# Patient Record
Sex: FEMALE | Race: WHITE | NOT HISPANIC OR LATINO | URBAN - METROPOLITAN AREA
[De-identification: names, ages, dates, MRNs, and addresses within clinical notes are randomized per-mention and may not be internally consistent; named-entity substitution may affect disease eponyms.]

---

## 2022-01-21 ENCOUNTER — OFFICE (OUTPATIENT)
Dept: URBAN - METROPOLITAN AREA CLINIC 102 | Facility: CLINIC | Age: 42
End: 2022-01-21

## 2022-01-21 VITALS
WEIGHT: 221 LBS | HEART RATE: 83 BPM | TEMPERATURE: 98.2 F | DIASTOLIC BLOOD PRESSURE: 70 MMHG | SYSTOLIC BLOOD PRESSURE: 123 MMHG | HEIGHT: 61 IN

## 2022-01-21 DIAGNOSIS — D50.9 IRON DEFICIENCY ANEMIA, UNSPECIFIED: ICD-10-CM

## 2022-01-21 DIAGNOSIS — R12 HEARTBURN: ICD-10-CM

## 2022-01-21 DIAGNOSIS — Z98.84 BARIATRIC SURGERY STATUS: ICD-10-CM

## 2022-01-21 PROCEDURE — 00031: CPT | Performed by: INTERNAL MEDICINE

## 2022-01-21 PROCEDURE — 99204 OFFICE O/P NEW MOD 45 MIN: CPT

## 2022-01-21 RX ORDER — PANTOPRAZOLE SODIUM 40 MG/1
TABLET, DELAYED RELEASE ORAL
Qty: 90 | Refills: 0 | Status: ACTIVE

## 2022-01-21 NOTE — SERVICEHPINOTES
YOVANNY MELGAR   is a   41   year old    female who is being seen in consultation at the request of   AROLDO RICARDO   for anemia. Pt with PMHx of gastric sleeve surgery in 2015 with revision into gastric bypass in 2018. 
br She has had anemia in the past but ever since gastric bypass levels have dropped as of April 2021. Has been following with hematologist and required iron infusions recently (levels cont to drop w/ PO iron). She has been symptomatic, fatigued, etc. --even when not anemic ferritin level was undetectable.  9/2021 hgb 9.1, ferritin 2.br
 12/20 hgb 12.6, ferritin undetected, iron sat 25%
br
brStates she had revision into gastric bypass as she gained weight and was having GERD sx. Had EGD prior to gastric bypass in 2018 (states ulcer was seen) but otherwise no GI endoscopic eval or recent EGD. br Having recurrence of GERD sx lately as she has gained weight within the past year or so. Has taken 14 day courses of prilosec otc which helps then sx return upon stopping. Denies n/v, dysphagia.brAvoids NSAIDs, takes tylenol as needed.br Nonsmoker, No regular etoh use. Coffee, ~1-2 cups every AM and black tea throughout the day. br
br 
br
br BMs 4-6x/day, BSS type 5-7, which has been her "normal" baseline since bariatric surgery.   Denies rectal bleeding recently--h/o anal fissure but no issues recently. No prior colonoscopy. br RLQ pain x 6 months, always there vero if she stands up, unrelated to PO intake, BMs, or mensesbr Periods very irregular, is on OCP. She has not seen ob/gyn recently and is planning to make an appt soon.br
br CT A/P w/ PO/IV contrast 12/30 showed right hepatic 4cm hemangioma, bilateral ovarian cysts, gastric bypass changes without complications no other abnormalities. br

## 2022-02-03 ENCOUNTER — OFFICE (OUTPATIENT)
Dept: URBAN - METROPOLITAN AREA CLINIC 98 | Facility: CLINIC | Age: 42
End: 2022-02-03
Payer: COMMERCIAL

## 2022-02-03 VITALS
HEART RATE: 81 BPM | OXYGEN SATURATION: 98 % | DIASTOLIC BLOOD PRESSURE: 74 MMHG | DIASTOLIC BLOOD PRESSURE: 76 MMHG | HEART RATE: 93 BPM | TEMPERATURE: 97.6 F | DIASTOLIC BLOOD PRESSURE: 70 MMHG | RESPIRATION RATE: 28 BRPM | OXYGEN SATURATION: 95 % | SYSTOLIC BLOOD PRESSURE: 124 MMHG | HEART RATE: 84 BPM | DIASTOLIC BLOOD PRESSURE: 61 MMHG | OXYGEN SATURATION: 99 % | DIASTOLIC BLOOD PRESSURE: 83 MMHG | SYSTOLIC BLOOD PRESSURE: 107 MMHG | RESPIRATION RATE: 15 BRPM | HEART RATE: 60 BPM | OXYGEN SATURATION: 96 % | RESPIRATION RATE: 16 BRPM | SYSTOLIC BLOOD PRESSURE: 145 MMHG | OXYGEN SATURATION: 92 % | RESPIRATION RATE: 25 BRPM | SYSTOLIC BLOOD PRESSURE: 108 MMHG | WEIGHT: 221 LBS | SYSTOLIC BLOOD PRESSURE: 126 MMHG | HEART RATE: 88 BPM | HEART RATE: 74 BPM | RESPIRATION RATE: 21 BRPM | SYSTOLIC BLOOD PRESSURE: 100 MMHG | SYSTOLIC BLOOD PRESSURE: 87 MMHG | OXYGEN SATURATION: 91 % | HEIGHT: 61 IN | TEMPERATURE: 98.1 F | DIASTOLIC BLOOD PRESSURE: 85 MMHG | HEART RATE: 96 BPM | DIASTOLIC BLOOD PRESSURE: 78 MMHG | SYSTOLIC BLOOD PRESSURE: 102 MMHG | HEART RATE: 78 BPM

## 2022-02-03 DIAGNOSIS — D50.9 IRON DEFICIENCY ANEMIA, UNSPECIFIED: ICD-10-CM

## 2022-02-03 DIAGNOSIS — K31.89 OTHER DISEASES OF STOMACH AND DUODENUM: ICD-10-CM

## 2022-02-03 DIAGNOSIS — K29.60 OTHER GASTRITIS WITHOUT BLEEDING: ICD-10-CM

## 2022-02-03 DIAGNOSIS — K57.30 DIVERTICULOSIS OF LARGE INTESTINE WITHOUT PERFORATION OR ABS: ICD-10-CM

## 2022-02-03 DIAGNOSIS — Z98.84 BARIATRIC SURGERY STATUS: ICD-10-CM

## 2022-02-03 DIAGNOSIS — K51.40 INFLAMMATORY POLYPS OF COLON WITHOUT COMPLICATIONS: ICD-10-CM

## 2022-02-03 DIAGNOSIS — R12 HEARTBURN: ICD-10-CM

## 2022-02-03 DIAGNOSIS — K62.89 OTHER SPECIFIED DISEASES OF ANUS AND RECTUM: ICD-10-CM

## 2022-02-03 PROBLEM — K63.5 POLYP OF COLON: Status: ACTIVE | Noted: 2022-02-03

## 2022-02-03 PROBLEM — Z48.815 ENCNTR FOR SURGICAL AFTCR FOLLOWING SURGERY ON THE DGSTV SYS: Status: ACTIVE | Noted: 2022-02-03

## 2022-02-03 LAB
GI LOWER HISTOLOGY - SPECM 1: CLINICAL INFORMATION: (no result)
GI LOWER HISTOLOGY - SPECM 1: CLINICAL OBSERVATIONS: (no result)
GI LOWER HISTOLOGY - SPECM 1: CPT CODES: (no result)
GI LOWER HISTOLOGY - SPECM 1: DIAGNOSIS: (no result)
GI LOWER HISTOLOGY - SPECM 1: GROSS DESCRIPTION: (no result)
GI LOWER HISTOLOGY - SPECM 1: INCOMING ICD CODE(S): (no result)
GI LOWER HISTOLOGY - SPECM 1: MICROSCOPIC DESCRIPTION: (no result)
GI LOWER HISTOLOGY - SPECM 1: PATHOLOGIST: (no result)
GI LOWER HISTOLOGY - SPECM 1: REPORT TITLE: (no result)
GI LOWER HISTOLOGY - SPECM 1: SPECIMEN SOURCE: (no result)
GI LOWER HISTOLOGY - SPECM 1: SYNOPSIS: (no result)
GI LOWER POLYPECTOMY EXCISION - SPECM 1: CLINICAL INFORMATION: (no result)
GI LOWER POLYPECTOMY EXCISION - SPECM 1: CLINICAL OBSERVATIONS: (no result)
GI LOWER POLYPECTOMY EXCISION - SPECM 1: CPT CODES: (no result)
GI LOWER POLYPECTOMY EXCISION - SPECM 1: DIAGNOSIS: (no result)
GI LOWER POLYPECTOMY EXCISION - SPECM 1: GROSS DESCRIPTION: (no result)
GI LOWER POLYPECTOMY EXCISION - SPECM 1: INCOMING ICD CODE(S): (no result)
GI LOWER POLYPECTOMY EXCISION - SPECM 1: MICROSCOPIC DESCRIPTION: (no result)
GI LOWER POLYPECTOMY EXCISION - SPECM 1: PATHOLOGIST: (no result)
GI LOWER POLYPECTOMY EXCISION - SPECM 1: REPORT TITLE: (no result)
GI LOWER POLYPECTOMY EXCISION - SPECM 1: SPECIMEN COMMENT: (no result)
GI LOWER POLYPECTOMY EXCISION - SPECM 1: SPECIMEN SOURCE: (no result)
GI LOWER POLYPECTOMY EXCISION - SPECM 1: SYNOPSIS: (no result)
GI UPPER EGD HISOLOGY - SPECM 1: CLINICAL INFORMATION: (no result)
GI UPPER EGD HISOLOGY - SPECM 1: CPT CODES: (no result)
GI UPPER EGD HISOLOGY - SPECM 1: DIAGNOSIS: (no result)
GI UPPER EGD HISOLOGY - SPECM 1: GROSS DESCRIPTION: (no result)
GI UPPER EGD HISOLOGY - SPECM 1: INCOMING ICD CODE(S): (no result)
GI UPPER EGD HISOLOGY - SPECM 1: MICROSCOPIC DESCRIPTION: (no result)
GI UPPER EGD HISOLOGY - SPECM 1: PATHOLOGIST: (no result)
GI UPPER EGD HISOLOGY - SPECM 1: REPORT TITLE: (no result)
GI UPPER EGD HISOLOGY - SPECM 1: SPECIMEN SOURCE: (no result)
GI UPPER EGD HISOLOGY - SPECM 1: SYNOPSIS: (no result)
PDF REPORT: (no result)